# Patient Record
Sex: FEMALE | Race: WHITE
[De-identification: names, ages, dates, MRNs, and addresses within clinical notes are randomized per-mention and may not be internally consistent; named-entity substitution may affect disease eponyms.]

---

## 2022-09-21 ENCOUNTER — HOSPITAL ENCOUNTER (EMERGENCY)
Dept: HOSPITAL 97 - ER | Age: 62
Discharge: HOME | End: 2022-09-21
Payer: COMMERCIAL

## 2022-09-21 DIAGNOSIS — R07.89: Primary | ICD-10-CM

## 2022-09-21 DIAGNOSIS — I11.9: ICD-10-CM

## 2022-09-21 DIAGNOSIS — I10: ICD-10-CM

## 2022-09-21 DIAGNOSIS — M54.9: ICD-10-CM

## 2022-09-21 LAB
ALBUMIN SERPL BCP-MCNC: 3.6 G/DL (ref 3.4–5)
ALP SERPL-CCNC: 78 U/L (ref 45–117)
ALT SERPL W P-5'-P-CCNC: 27 U/L (ref 12–78)
AST SERPL W P-5'-P-CCNC: 17 U/L (ref 15–37)
BUN BLD-MCNC: 16 MG/DL (ref 7–18)
GLUCOSE SERPLBLD-MCNC: 132 MG/DL (ref 74–106)
HCT VFR BLD CALC: 38.7 % (ref 36–45)
INR BLD: 1.03
LIPASE SERPL-CCNC: 178 U/L (ref 73–393)
LYMPHOCYTES # SPEC AUTO: 2.5 K/UL (ref 0.7–4.9)
MAGNESIUM SERPL-MCNC: 2.1 MG/DL (ref 1.8–2.4)
MCV RBC: 82.4 FL (ref 80–100)
NT-PROBNP SERPL-MCNC: 42 PG/ML (ref ?–125)
PMV BLD: 8.2 FL (ref 7.6–11.3)
POTASSIUM SERPL-SCNC: 3.5 MMOL/L (ref 3.5–5.1)
RBC # BLD: 4.7 M/UL (ref 3.86–4.86)
TROPONIN I SERPL HS-MCNC: 4 PG/ML (ref ?–58.9)

## 2022-09-21 PROCEDURE — 84484 ASSAY OF TROPONIN QUANT: CPT

## 2022-09-21 PROCEDURE — 36415 COLL VENOUS BLD VENIPUNCTURE: CPT

## 2022-09-21 PROCEDURE — 71275 CT ANGIOGRAPHY CHEST: CPT

## 2022-09-21 PROCEDURE — 71045 X-RAY EXAM CHEST 1 VIEW: CPT

## 2022-09-21 PROCEDURE — 99284 EMERGENCY DEPT VISIT MOD MDM: CPT

## 2022-09-21 PROCEDURE — 85610 PROTHROMBIN TIME: CPT

## 2022-09-21 PROCEDURE — 80048 BASIC METABOLIC PNL TOTAL CA: CPT

## 2022-09-21 PROCEDURE — 80076 HEPATIC FUNCTION PANEL: CPT

## 2022-09-21 PROCEDURE — 85025 COMPLETE CBC W/AUTO DIFF WBC: CPT

## 2022-09-21 PROCEDURE — 83735 ASSAY OF MAGNESIUM: CPT

## 2022-09-21 PROCEDURE — 93005 ELECTROCARDIOGRAM TRACING: CPT

## 2022-09-21 PROCEDURE — 96374 THER/PROPH/DIAG INJ IV PUSH: CPT

## 2022-09-21 PROCEDURE — 85379 FIBRIN DEGRADATION QUANT: CPT

## 2022-09-21 PROCEDURE — 83880 ASSAY OF NATRIURETIC PEPTIDE: CPT

## 2022-09-21 PROCEDURE — 83690 ASSAY OF LIPASE: CPT

## 2022-09-21 PROCEDURE — 96375 TX/PRO/DX INJ NEW DRUG ADDON: CPT

## 2022-09-21 NOTE — RAD REPORT
EXAM DESCRIPTION:  CT - Chest For Pe Angio - 9/21/2022 7:34 pm

 

CLINICAL HISTORY:  Chest pain.

upper back pain, shortness of breath

 

COMPARISON:  No comparisons

 

TECHNIQUE:  CT angiogram of the pulmonary arteries was performed with MIP.

 

All CT scans are performed using dose optimization technique as appropriate and may include automated
 exposure control or mA/KV adjustment according to patient size.

 

FINDINGS:  No evidence of pulmonary thromboembolism.

 

No acute aortic finding demonstrated.

 

The lungs are clear.

 

No significant pericardial or pleural fluid.

 

No concerning bony finding.

 

IMPRESSION:  No evidence of pulmonary thromboembolism.

 

No acute lung findings.

## 2022-09-21 NOTE — ER
Nurse's Notes                                                                                     

 [facility]                                                                 

Name: Jewell Raman                                                                            

Age: 61 yrs                                                                                       

Sex: Female                                                                                       

: 1960                                                                                   

MRN: [id number]                                                                                   

Arrival Date: 2022                                                                          

Time: 15:43                                                                                       

Account#: [id number]                                                                            

Bed 25                                                                                            

Private MD: Priyanka Clark                                                                          

Diagnosis: Dorsalgia, unspecified;Chest pain, unspecified;Hypertensive heart disease without heart

  failure                                                                                         

                                                                                                  

Presentation:                                                                                     

                                                                                             

16:06 Chief complaint: Patient states: left back pain under shoulder blade, shoots into       iw  

      breast, pain started a couple days ago, denies injury , just got back from a fishing        

      trip and thought she sprained it , a week ago she had swollen lymph node on left side       

      of neck but that has resolved . Coronavirus screen: At this time, the client does not       

      indicate any symptoms associated with coronavirus-19. Ebola Screen: Patient negative        

      for fever greater than or equal to 101.5 degrees Fahrenheit, and additional compatible      

      Ebola Virus Disease symptoms Patient denies exposure to infectious person. Patient          

      denies travel to an Ebola-affected area in the 21 days before illness onset. No             

      symptoms or risks identified at this time. Initial Sepsis Screen: Does the patient meet     

      any 2 criteria? No. Patient's initial sepsis screen is negative. Does the patient have      

      a suspected source of infection? No. Patient's initial sepsis screen is negative. Risk      

      Assessment: Do you want to hurt yourself or someone else? Patient reports no desire to      

      harm self or others. Onset of symptoms was 2022.                              

16:06 Method Of Arrival: Ambulatory                                                           iw  

16:06 Acuity: IGGY 3                                                                           iw  

                                                                                                  

Triage Assessment:                                                                                

22:03 General: Appears in no apparent distress. Behavior is calm, cooperative. Pain:          kd3 

      Complains of pain in chest. Cardiovascular: Patient's skin is warm and dry.                 

                                                                                                  

Historical:                                                                                       

- Allergies:                                                                                      

16:09 No Known Allergies;                                                                     iw  

- Home Meds:                                                                                      

16:09 lisinopril 20 mg Oral tab 1 tab once daily [Active];                                    iw  

- PMHx:                                                                                           

16:09 Hypertensive disorder;                                                                  iw  

- PSHx:                                                                                           

16:09 tubal ligation;                                                                         iw  

                                                                                                  

- Immunization history:: Adult Immunizations up to date.                                          

- Social history:: Smoking status: Patient denies any tobacco usage or history of.                

  unknown.                                                                                        

                                                                                                  

                                                                                                  

Screenin:02 Abuse screen: Denies threats or abuse. Denies injuries from another. Nutritional        kd3 

      screening: No deficits noted. Tuberculosis screening: No symptoms or risk factors           

      identified. Fall Risk IV access (20 points).                                                

                                                                                                  

Assessment:                                                                                       

21:30 General: Appears in no apparent distress. Behavior is calm, cooperative. Pain:          ha1 

      Complains of pain in left subscapular area Pain radiates to left shoulder Pain              

      currently is 10 out of 10 on a pain scale. Pain began 1 day ago. Neuro: Level of            

      Consciousness is awake, alert, obeys commands, Oriented to person, place, time,             

      situation. Cardiovascular: Patient's skin is warm and dry. Respiratory: Airway is           

      patent Trachea midline Respiratory effort is even, unlabored, Respiratory pattern is        

      regular, symmetrical. Musculoskeletal: Range of motion: intact in all extremities,          

      Reports pain in left subscapular area.                                                      

21:42 Reassessment: Patient and/or family updated on plan of care and expected duration. Pain ha1 

      level reassessed. Patient is alert, oriented x 3, equal unlabored respirations, skin        

      warm/dry/pink. Patient denies pain at this time. Patient states feeling better.             

23:03 Reassessment: Patient and/or family updated on plan of care and expected duration. Pain ha1 

      level reassessed. Patient is alert, oriented x 3, equal unlabored respirations, skin        

      warm/dry/pink. Patient denies pain at this time.                                            

                                                                                                  

Vital Signs:                                                                                      

16:06  / 96; Pulse 72; Resp 16; Temp 97.4; Pulse Ox 98% on R/A; Weight 90.72 kg; Height iw  

      5 ft. 4 in. (162.56 cm); Pain 10/10;                                                        

21:15  / 86; Pulse 72; Resp 19 S; Pulse Ox 98% on R/A;                                  ha1 

21:42 Resp 17 S; Pulse Ox 98% on R/A; Pain 3/10;                                              ha1 

22:02  / 88; Pulse 68; Resp 16; Pulse Ox 98% on R/A;                                    kd3 

23:07  / 92; Pulse 64; Resp 16; Pulse Ox 99% on R/A;                                    kd3 

16:06 Body Mass Index 34.33 (90.72 kg, 162.56 cm)                                               

                                                                                                  

ED Course:                                                                                        

15:43 Patient arrived in ED.                                                                  mr  

15:44 Priyanka Clark is Private Physician.                                                      mr  

16:09 Triage completed.                                                                       iw  

16:10 Arm band placed on.                                                                     iw  

16:13 Maksim Ho PA is PHCP.                                                                cp  

16:13 Maksim Posey MD is Attending Physician.                                             cp  

16:42 Inserted saline lock: 20 gauge in right forearm, using aseptic technique. Blood         kc6 

      collected.                                                                                  

16:42 Lipase Sent.                                                                            kc6 

16:42 Basic Metabolic Panel Sent.                                                             kc6 

16:42 CBC with Diff Sent.                                                                     kc6 

16:42 LFT's Sent.                                                                             kc6 

16:42 Magnesium Sent.                                                                         kc6 

16:42 NT PRO-BNP Sent.                                                                        kc6 

16:42 PT-INR Sent.                                                                            kc6 

16:42 Troponin HS Sent.                                                                       kc6 

20:42 Kaylin Llanos, JOURDAN is Primary Nurse.                                                    kd3 

23:06 No provider procedures requiring assistance completed. IV discontinued, intact,         kd3 

      bleeding controlled, No redness/swelling at site. Pressure dressing applied. Patient        

      maintains SpO2 saturation greater than 95% on room air.                                     

23:07 Patient has correct armband on for positive identification. Client placed on continuous kd3 

      cardiac and pulse oximetry monitoring. NIBP monitoring applied.                             

                                                                                                  

Administered Medications:                                                                         

21:19 Drug: TORadol (ketorolac) 30 mg Route: IVP; Site: right forearm;                        ha1 

21:42 Follow up: Response: No adverse reaction; Pain is decreased                             ha1 

21:19 Drug: morphine 4 mg Route: IVP; Infused Over: 4 mins; Site: right forearm;              ha1 

21:42 Follow up: Response: No adverse reaction; Pain is decreased; RASS: Alert and Calm (0)   ha1 

22:21 Drug: Lidoderm Patch 5 % (700 mg/patch) 1 patches Route: Topical; Site: affected area;  kd3 

23:06 Follow up: Response: No adverse reaction; Pain is decreased                             kd3 

22:58 Drug: Baclofen 10 mg Route: PO;                                                         kd3 

23:06 Follow up: Response: No adverse reaction; Pain is decreased                             kd3 

                                                                                                  

                                                                                                  

Medication:                                                                                       

23:07 VIS not applicable for this client.                                                     kd3 

                                                                                                  

Outcome:                                                                                          

22:58 Discharge ordered by MD.                                                                cp  

23:06 Discharged to home ambulatory.                                                          kd3 

23:06 Condition: stable                                                                           

23:06 Discharge instructions given to patient, family, Instructed on discharge instructions,      

      follow up and referral plans. medication usage, Demonstrated understanding of               

      instructions, follow-up care, medications.                                                  

23:08 Patient left the ED.                                                                    kd3 

                                                                                                  

Signatures:                                                                                       

Amanda Rodriguez Irene RN                     RN                                                      

Maksim Ho PA PA cp Doucette, Kyli, RN                      RN   kd3                                                  

Jerri Swift RN                        RN   ha1                                                  

Danuta Suárez                            kc6                                                  

                                                                                                  

Corrections: (The following items were deleted from the chart)                                    

16:10 16:09 PSHx: None; [facility]  

23:03 22:30 Reassessment: Patient and/or family updated on plan of care and expected          ha1 

      duration. Pain level reassessed. Patient is alert, oriented x 3, equal unlabored            

      respirations, skin warm/dry/pink. Patient denies pain at this time. Patient states          

      feeling better. ha1                                                                         

                                                                                                  

**************************************************************************************************

## 2022-09-21 NOTE — XMS REPORT
Continuity of Care Document

                          Created on:2022



Patient:JEWELL BROWN

Sex:Female

:1960

External Reference #:[id number]





Demographics







                          Address                   [address] [place] [address]

 

                          Home Phone                [phone]

 

                          Mobile Phone              [phone]

 

                          Email Address             [email or web address]

 

                          Preferred Language        en

 

                          Marital Status            Unknown

 

                          Faith Affiliation     Unknown

 

                          Race                      Unknown

 

                          Additional Race(s)        Unavailable



                                                    White

 

                          Ethnic Group              Not  or 









Author







                          Organization              [facility]

t

 

                          Address                   [address] [place] [address] [address]



                                                    [place] [address]

 

                          Phone                     [phone]









Support







                Name            Relationship    Address         Phone

 

                Jewell Brown Unavailable     [address] [place] [address] [phone]



                                                [place] [address] 

 

                Caleb Bear Unavailable     Unavailable     [phone]

 

                Jcarlos Smith  Domestic partner [address] [place] [address] [phone]



                                                [place] [address] 









Care Team Providers







                    Name                Role                Phone

 

                    Priyanka Jimenez Primary Care Physician [phone]

[phone]

 

                    Priyanka Clark        Attending Clinician Unavailable

 

                    Matthew MATHEWS, Berto       Attending Clinician [phone]

 

                    Lali Tirado MD   Attending Clinician [phone]

 

                    Mily Flores Attending Clinician Unavailable









Payers







           Payer Name Policy Type Policy Number Effective Date Expiration Date S

ource







Problems







       Condition Condition Condition Status Onset  Resolution Last   Treating Co

mments 

Source



       Name   Details Category        Date   Date   Treatment Clinician        



                                                 Date                 

 

       Essential Essential Disease Active                              [facility]

[facility]



       hypertensi hypertensi               7-05                               st



       on     on                   00:00:                             Hospita



                                   00                                 l

 

       Crohn's Crohn's Disease Active                       Overview: [facility]

[facility]



       disease of disease of               6-14                        Formattin

 st



       both small both small               00:00:                      g of this

 Hospita



       and large and large               00                          note   l



       intestine intestine                                           might be 



       without without                                           different 



       complicati complicati                                           from the 



       on     on                                               original. 



                                                               Added  



                                                               automatic 



                                                               ally from 



                                                               request 



                                                               for    



                                                               surgery 



                                                               [id number] 







Allergies, Adverse Reactions, Alerts

This patient has no known allergies or adverse reactions.



Family History







           Family Member Diagnosis  Comments   Start Date Stop Date  Source

 

           Natural father Crohn's disease                                  [facility]

 

           Natural mother Cancer                                      [facility] mother Kidney failure                                  [facility]







Social History







           Social Habit Start Date Stop Date  Quantity   Comments   Source

 

           Tobacco use and 2022 Smokeless tobacco            [facility]

[facility]



           exposure   00:00:00   00:00:00   non-user              Hospital

 

           Alcohol intake 2022 Ex-drinker            [facility]



                      00:00:00   00:00:00   (finding)             Hospital

 

           Sex Assigned At 1960 F                     [facility]



           Birth      00:00:00   00:00:00                         Hospital









                Smoking Status  Start Date      Stop Date       Source

 

                Never smoked tobacco                                 [facility]

ospital







Medications







       Ordered Filled Start  Stop   Current Ordering Indication Dosage Frequency

 Signature

                    Comments            Components          Source



     Medication Medication Date Date Medication? Clinician                (SIG) 

          



     Name Name                                                   

 

     vedolizumab                   300mg      Infuse 300         

  [facility]



     (ENTYVIO)       0705                          mg into a           st



     300 mg      19:38: 00:00                          venous           Hospita



     recon soln      45   :00                           catheter           l



     IV solution                                         once.           

 

     aspirin            Yes            1{tbl}      1 tablet.           [facility]

[facility]



     (ECOTRIN)                                                    st



     81 MG      16:27:                                              Hospita



     enteric      46                                                l



     coated                                                        



     tablet                                                        

 

     Lactobac            Yes                                     [facility]



     no.41/Bifid                                                    st



     obact no.7      16:27:                                              [facility]ospita



     (PROBIOTIC-      46                                                l



     10 ORAL)                                                        

 

     zinc            Yes            1{tbl}      1 tablet.           [facility]

i



     gluconate                                                    st



     100 mg      16:27:                                              Hospita



     tablet      46                                                l

 

     cholecalcif            Yes                      Take by           Met

janeen



     vicki,                                     mouth.           st



     vitamin D3,      16:27:                                              Hospit

a



     125 mcg      46                                                l



     (5,000                                                        



     unit)                                                        



     tablet                                                        

 

     BIOTIN,            Yes                                     [facility]



     BULK, MISC                                                    st



               16:27:                                              Hospita



               46                                                l

 

     ascorbic      0      Yes                      Take by           Method

i



     acid                                     mouth.           st



     (VITAMIN C      16:27:                                              Hospita



     ORAL)      46                                                l

 

     multivitami            Yes            1{tbl} QD   Take 1           Me

thodi



     n tablet                                     tablet by           st



               16:27:                               mouth           Hospita



               46                                 daily.           l

 

     calcium            Yes                      Take by           [facility]



     carbonate                                     mouth.           st



     (CALCIUM      16:27:                                              Hospita



     600 ORAL)      46                                                l

 

     fish            Yes                      Take by           [facility]



     oil/borage/      7-05                               mouth.           st



     flax/om3,6,      16:27:                                              Hospit

a



     9 1 (OMEGA      46                                                l



     3-6-9 ORAL)                                                        

 

     hydrOXYchlo            Yes                      take as           [facility]

h[facility]



     roQUINE      -13                               directed.           st



     (PLAQUENIL)      00:00:                                              Hospit

a



     200 mg      00                                                l



     tablet                                                        

 

     sod       2022- No             1{bottl Q.5D Take 1           [facility]



     picosulf-ma       06-15                e}        Bottle by           st



     g ox-citric      00:00: 04:59                          mouth 2           Ho

spita



     ac        00   :00                           (two)           l



     (Clenpiq)                                         times a           



     10 mg-3.5                                         day for 1           



     gram -12                                         day. Take           



     gram/160 mL                                         at times           



     solution                                         mentioned           



                                                  in your           



                                                  colonoscop           



                                                  y              



                                                  instructio           



                                                  ns             

 

     bisacodyL       No             20mg      Take 4           [facility]

di



     (Dulcolax,      -14                          tablets           st



     bisacodyl,)      00:00: 04:59                          (20 mg           Hos

kristopher



     5 mg EC      00   :00                           total) by           l



     tablet                                         mouth once           



                                                  for 1           



                                                  dose. Take           



                                                  at 3pm day           



                                                  prior to           



                                                  procedure           

 

     lisinopriL-            Yes            1{tbl} QD   Take 1           Me

thodi



     hydrochloro      6-02                               tablet by           st



     thiazide      00:00:                               mouth           Hospita



     (PRINZIDE)      00                                 daily.           l



     20-12.5 mg                                                        



     per tablet                                                        

 

     RABEprazole            Yes            20mg QD   Take 20 mg           

[facility]



     (ACIPHEX)      5-22                               by mouth           st



     20 mg EC      00:00:                               daily.           Hospita



     tablet      00                                                l







Vital Signs







             Vital Name   Observation Time Observation Value Comments     Source

 

             Systolic blood 2022 21:25:00 146 mm[Hg]                [facility]



             pressure                                            

 

             Diastolic blood 2022 21:25:00 74 mm[Hg]                 [facility]



             pressure                                            

 

             Heart rate   2022 21:25:00 67 /min                   [facility]

 

             Body height  2022 21:25:00 162.6 cm                  [facility]

 

             Body weight  2022 21:25:00 90.719 kg                 [facility]

 

             BMI          2022 21:25:00 34.33 kg/m2               [facility]

 

             Oxygen saturation in 2022 21:25:00 96 /min                   

[facility]



             Arterial blood by                                        



             Pulse oximetry                                        

 

             Respiratory rate 2022 16:07:00 13 /min                   [facility]







Procedures







                Procedure       Date / Time     Performing Clinician Source



                                Performed                       

 

                ECG 12-LEAD     2022 21:32:26 CandidaLali  [facility]

spital

 

                CT ENTEROGRAPHY 2022 17:49:16 Matthew, [facility]

spital

 

                FECAL CALPROTECTIN 2022 13:00:00 Matthew, [facility]

 

                TPMT GENOTYPING, 4 2022 17:44:00 Matthew, [facility]



                VARIANTS                                        

 

                C-REACTIVE PROTEIN 2022 17:44:00 Walton, [facility]

 

                QUANTIFERON-TB GOLD PLUS, 2022 17:44:00 Walton, [facility]



                4 TUBE                                          

 

                VITAMIN B12 LEVEL 2022 17:44:00 Walton, [facility]

 

                FOLATE LEVEL    2022 17:44:00 Matthew, [facility]

spital

 

                COCCIDIOIDES ANTIBODY, 2022 17:44:00 Matthew, [facility]



                IGG/IGM BY INGE                                 

 

                TOTAL IRON BINDING 2022 17:44:00 Walton, [facility]



                CAPACITY                                        

 

                FERRITIN LEVEL  2022 17:44:00 Matthew, [facility]

spital

 

                VITAMIN D 25 HYDROXY 2022 17:44:00 Matthew, [facility]



                LEVEL                                           

 

                HEPATITIS B CORE ANTIBODY 2022 17:44:00 Matthew, [facility]



                IGM                                             

 

                HEPATITIS B SURFACE 2022 17:44:00 Matthew, [facility]



                ANTIGEN                                         

 

                HEPATITIS B SURFACE 2022 17:44:00 Walton, [facility]



                ANTIBODY                                        

 

                HEPATITIS A ANTIBODY 2022 17:44:00 Matthew, [facility]



                TOTAL                                           

 

                HEPATITIS C ANTIBODY 2022 17:44:00 Matthew, [facility]

 

                HEPATITIS B CORE ANTIBODY 2022 17:44:00 Walton, [facility]



                TOTAL                                           

 

                HC COMPLETE BLD COUNT 2022 17:44:00 Matthew, Berto      [facility]



                W/AUTO DIFF                                     

 

                HIV 1/2 ANTIGEN/ANTIBODY, 2022 17:44:00 MatthewBerto      [facility]



                FOURTH GENERATION, WITH                                 



                REFLEXES                                        

 

                COMPREHENSIVE METABOLIC 2022 17:44:00 MatthewBerto      [facility]



                PANEL                                           

 

                ESTIMATED GFR   2022 17:44:00 MatthewBerto      [facility]

spital







Plan of Care







             Planned Activity Planned Date Details      Comments     Source

 

             Future Scheduled 2022   INFLUENZA VACCINE              [facility]



             Test         11:01:31     [code = INFLUENZA              



                                       VACCINE]                  

 

             Future Scheduled 2022   COVID-19 VACCINE (#1)              [facility]



             Test         11:01:31     [code = COVID-19              



                                       VACCINE (#1)]              

 

             Future Scheduled 2022   Screening for              [facility]



             Test         11:01:31     malignant neoplasm of              



                                       cervix (procedure)              



                                       [code = 590410057]              

 

             Future Scheduled 2022   BREAST CANCER              [facility]



             Test         11:01:31     SCREENING [code =              



                                       BREAST CANCER              



                                       SCREENING]                

 

             Future Scheduled 2022   COLONOSCOPY SCREENING              [facility]



             Test         11:01:31     [code = COLONOSCOPY              



                                       SCREENING]                

 

             Future Scheduled 2022   SHINGLES VACCINES (1              Met

[facility]



             Test         11:01:31     of 2) [code =              



                                       SHINGLES VACCINES (1              



                                       of 2)]                    

 

             Future Scheduled 2022   HEPATITIS B VACCINES              Met

[facility]



             Test         11:01:31     (1 of 3 - Risk 3-dose              



                                       series) [code =              



                                       HEPATITIS B VACCINES              



                                       (1 of 3 - Risk 3-dose              



                                       series)]                  







Encounters







        Start   End     Encounter Admission Attending Care    Care    Encounter 

Source



        Date/Time Date/Time Type    Type    Clinicians Facility Department ID   

   

 

        2022         Outpatient         JENS Clark  [facility]  [id number]

 Common



        09:22:01                         Priyanka                      [facility]

 

        2022-02-10         [facility]  [id number]

 Common



        11:18:02                         Priyanka                      [facility]

 

        20[facility]  [id number]

 Common



        13:35:18                         Priyanka                      [facility]

 

        2022         Outpatient         JENS Clark  [facility]  [id number]

 Common



        12:57:27                         Priyanka                   [id number]   [facility]

 

        2022         Outpatient         JENS Clark  [facility]  [id number]

 Common



        12:47:18                         Priyanka                   [id number]   [facility]

 

        2022 Hospital         Matthew, Berto 1.2.840.1 [id number]

[id number] 

[facility]



        00:00:00 00:00:00 Encounter                 24031.1.1         910     st



                                                3.430.2.7                 Hospit

a



                                                .[id number]                 l



                                                .8                      

 

        2022 [facility]         Matthew, Berto 1.2.840.1 [id number]

[id number] 

[facility]



        11:25:36 23:59:00 Encounter                 29135.1.1         888     st



                                                3.430.2.7                 Hospit

a



                                                .3.475874                 l



                                                .8                      

 

        2022 Office          Adrogue, 1.2.840.1 [id number]

[id number] [facility]



        16:00:00 16:55:33 Visit           Lali   31111.1.1         813     st



                                                3.430.2.7                 Hospit

a



                                                .3.630831                 l



                                                .8                      

 

        2022 Lab             Walton, Berto 1.2.840.1 [id number]

[id number] [facility]



        13:00:00 13:05:00                         89836.1.1         036     st



                                                3.430.2.7                 Hospit

a



                                                .3.138298                 l



                                                .8                      

 

        2022 Outpatient         ADROGUE, [facility]     [id number]

[id number]



        00:00:00 00:00:00                 LALI                   813     Method

i



                                                                        st

 

        2022 Outpatient         MATTHEW, BERTO [facility]     [id number]

[id number] [facility]



        00:00:00 00:00:00                                         036     Method

i



                                                                        st

 

        2022 Outpatient         MATTHEW, BERTO [facility]     2100

[id number] [facility]



        00:00:00 00:00:00                                         888     Method

i



                                                                        st

 

        2022 Travel                  1.2.840.1 [id number]

[id number] [facility]



        00:00:00 00:00:00                         16396.1.1 [id number] 576     

st



                                                3.430.2.7 0.2.7.3.698         Ho

spita



                                                .3.095797 084.8           l



                                                .8                      

 

        2022 ambulatory                 [facility]  [facility]  [id number]

 Common



        00:00:00 00:00:00                                                 [facility]



                                                                        - [facility]

 

        2022 Prep for         Mark, 1.2.840.1 [id number]

[id number] [facility]



        00:00:00 00:00:00 Surgery         Mily SANTA 73508.1.1         834     s

t



                                                3.430.2.7                 Hospit

a



                                                .3.869405                 l



                                                .8                      

 

        2022 Lab             Berto Castro 1.2.840.1 [id number]

[id number] Methodi



        12:10:00 12:15:00                         29914.1.1         044     st



                                                3.430.2.7                 Hospit

a



                                                .3.544131                 l



                                                .8                      

 

        2022 Office          MatthewBerto wilde 1.2.840.1 [id number]

[id number] Methodi



        11:00:00 11:35:12 Visit                   47542.1.1         077     st



                                                3.430.2.7                 Hospit

a



                                                .3.433935                 l



                                                .8                      

 

        2022 Outpatient         MATTHEW, BERTO [facility]     

[id number] [place]



        00:00:00 00:00:00                                         077     Method

i



                                                                        st

 

        2022 Travel                  1.2.840.1 1.2.606.586 [id number]

[id number] Methodi



        00:00:00 00:00:00                         56632.1.1 350.1.13.43 790     

st



                                                3.430.2.7 0.2.7.3.698         Ho

spita



                                                .3.295919 084.8           l



                                                .8                      

 

        2022 Outpatient         MATTHEW, BERTO [facility]     [id number]

[id number] [place]



        00:00:00 00:00:00                                         044     Method

i



                                                                        st

 

        2022 Travel                  1.2.840.1 1.2.060.023 [id number]

[id number] Methodi



        00:00:00 00:00:00                         16434.1.1 350.1.13.43 088     

st



                                                3.430.2.7 0.2.7.3.698         Ho

spita



                                                .3.890510 084.8           l



                                                .8                      

 

        2022 ambulatory                 [facility]  [facility]  [id number]

 Common



        00:00:00 00:00:00                                                 [facility]

 

        2021 Outpatient                 [facility]  [facility]  [id number]

 Common



        00:00:00 00:00:00                                                 [facility]

 

        2021-10-06 2021-10-06 Outpatient                 [facility]  [facility]  [id number]

 Common



        00:00:00 00:00:00                                                 [facility]

 

        2021-10-05 2021-10-05 Outpatient                 [facility]  [facility]  [id number]

 Common



        00:00:00 00:00:00                                                 [facility]

 

        2021 Outpatient                 [facility]  [facility]  [id number]

 Common



        00:00:00 00:00:00                                                 [facility]

 

        2021 Outpatient                 [facility]  [facility]  [id number]

 Common



        00:00:00 00:00:00                                                 [facility]

 

        2021 Outpatient                 [facility]  [facility]  [id number]

 Common



        00:00:00 00:00:00                                                 [facility]

 

        2021 Outpatient                 [facility]  [facility]  [id number]

 Common



        00:00:00 00:00:00                                                 [facility]

 

        2021 Outpatient                 [facility]  [facility]  [id number]

 Common



        00:00:00 00:00:00                                                 [facility]







Results







           Test Description Test Time  Test Comments Results    Result Comments 

Source









                    ECG 12 lead         2022-07-15 21:20:14 









                      Test Item  Value      Reference Range Interpretation Comme

nts









             Ventricular rate (test code = 253)                                 

       

 

             Atrial rate (test code = 255)                                      

  

 

             DE interval (test code = 266)                                      

  

 

             QRSD interval (test code = 260)                                    

    

 

             QT interval (test code = 264)                                      

  

 

             QTC interval (test code = 265)                                     

   

 

             P axis 1 (test code = 267)                                        

 

             QRS axis 1 (test code = 268)                                       

 

 

             T wave axis (test code = 270)                                      

  

 

             EKG impression (test code = 273) Normal sinus rhythm-Inferior infar

ct , age                           



                          undetermined-Abnormal ECG-No previous ECGs            

               



                          available-Electronically Signed By Lali Tirado MD () on 7/15/2022 4:20:13 PM              

             



[id number]39:00





             Test Item    Value        Reference Range Interpretation Comments

 

             Fecal calprotectin (test              See_Comment  H             [A

utomated message]



             code = 38445-3)                                        The system w

[facility]



                                                                 generated this 

result



                                                                 transmitted ref

erence



                                                                 range: <15.6-12

0



                                                                 mg/kg. The refe

rence



                                                                 range was not u

sed to



                                                                 interpret this 

result



                                                                 as normal/abnor

mal.

 

             Lab Interpretation (test Abnormal                               



             code = 49302-7)                                        



[facility]

## 2022-09-21 NOTE — EDPHYS
Physician Documentation                                                                           

 [facility]                                                                 

Name: Jewell Raman                                                                            

Age: 61 yrs                                                                                       

Sex: Female                                                                                       

: 1960                                                                                   

MRN: [id number]                                                                                   

Arrival Date: 2022                                                                          

Time: 15:43                                                                                       

Account#: [id number]                                                                            

Bed 25                                                                                            

Private MD: Priyanka Clark ED Physician Maksim Posey                                                                      

HPI:                                                                                              

                                                                                             

16:25 This 61 yrs old Female presents to ER via Ambulatory with complaints of Chest Pain,     cp  

      Back Pain, High Blood Pressure.                                                             

16:25 The patient presents with pain that is acute, with no known mechanism of injury. The    cp  

      symptoms are located in the left scapular area and left subscapular area.                   

16:25 Onset: The symptoms/episode began/occurred 2-3 days ago. The pain radiates to the left  cp  

      side of chest.                                                                              

16:25 Associated signs and symptoms: Pertinent negatives: abdominal pain, fever, numbness,    cp  

      tingling, vomiting, weakness. The problem was sustained from unknown cause. Modifying       

      factors: the patient symptoms are aggravated by movement, deep breaths. Severity of         

      symptoms: in the emergency department the symptoms are unchanged, despite home              

      interventions.                                                                              

                                                                                                  

Historical:                                                                                       

- Allergies:                                                                                      

16:09 No Known Allergies;                                                                     iw  

- Home Meds:                                                                                      

16:09 lisinopril 20 mg Oral tab 1 tab once daily [Active];                                    iw  

- PMHx:                                                                                           

16:09 Hypertensive disorder;                                                                  iw  

- PSHx:                                                                                           

16:09 tubal ligation;                                                                         iw  

                                                                                                  

- Immunization history:: Adult Immunizations up to date.                                          

- Social history:: Smoking status: Patient denies any tobacco usage or history of.                

  unknown.                                                                                        

                                                                                                  

                                                                                                  

ROS:                                                                                              

16:30 Back: Positive for pain at rest, pain with movement, of the left scapular area and left cp  

      subscapular area.                                                                           

16:30 Constitutional: Negative for body aches, chills, fever, poor PO intake.                 cp  

16:30 Eyes: Negative for injury, pain, redness, and discharge.                                cp  

16:30 ENT: Negative for drainage from ear(s), ear pain, sore throat, difficulty swallowing,       

      difficulty handling secretions.                                                             

16:30 Cardiovascular: Positive for chest pain, of the left side of chest, Negative for edema,     

      palpitations.                                                                               

16:30 Respiratory: Negative for cough, shortness of breath, wheezing.                             

16:30 Abdomen/GI: Negative for abdominal pain, nausea, vomiting, and diarrhea.                    

16:30 Skin: Negative for cellulitis, rash.                                                        

16:30 Neuro: Negative for altered mental status, headache, numbness, tingling, weakness.          

16:30 All other systems are negative.                                                             

                                                                                                  

Exam:                                                                                             

16:35 Constitutional: The patient appears in no acute distress, alert, awake,                 cp  

      non-diaphoretic, non-toxic, well developed, well nourished, uncomfortable.                  

16:35 Head/Face:  Normocephalic, atraumatic.                                                  cp  

16:35 Eyes: Periorbital structures: appear normal, Conjunctiva: normal, no exudate, no            

      injection, Sclera: no appreciated abnormality, Lids and lashes: appear normal,              

      bilaterally.                                                                                

16:35 ENT: External ear(s): are unremarkable, Nose: is normal, Mouth: Lips: moist, Oral           

      mucosa: pink and intact, moist, Posterior pharynx: Airway: no evidence of obstruction,      

      patent.                                                                                     

16:35 Neck: ROM/movement: is normal, is supple, without pain, no range of motions                 

      limitations, no nuchal rigidity.                                                            

16:35 Chest/axilla: Inspection: normal, Palpation: is normal, no crepitus, no tenderness.         

16:35 Cardiovascular: Rate: normal, Rhythm: regular, Edema: is not appreciated, JVD: is not       

      appreciated.                                                                                

16:35 Respiratory: the patient does not display signs of respiratory distress,  Respirations:     

      normal, no use of accessory muscles, no retractions, labored breathing, is not present,     

      Breath sounds: are clear throughout, no decreased breath sounds, no stridor, no             

      wheezing.                                                                                   

16:35 Abdomen/GI: Inspection: abdomen appears normal, Palpation: abdomen is soft and              

      non-tender, in all quadrants.                                                               

16:35 Back: pain, that is moderate, of the  left scapular area and left subscapular area, ROM     

      is normal, CVA tenderness, is absent, vertebral tenderness, is not appreciated.             

16:35 Skin: cellulitis, is not appreciated, no rash present.                                      

16:35 Neuro: Orientation: to person, place \T\ time. Mentation: is normal, Motor: moves all       

      fours, strength is normal, Sensation: is normal.                                            

16:42 ECG was reviewed by the Attending Physician.                                            cp  

                                                                                                  

Vital Signs:                                                                                      

16:06  / 96; Pulse 72; Resp 16; Temp 97.4; Pulse Ox 98% on R/A; Weight 90.72 kg; Height iw  

      5 ft. 4 in. (162.56 cm); Pain 10/10;                                                        

21:15  / 86; Pulse 72; Resp 19 S; Pulse Ox 98% on R/A;                                  ha1 

21:42 Resp 17 S; Pulse Ox 98% on R/A; Pain 3/10;                                              ha1 

22:02  / 88; Pulse 68; Resp 16; Pulse Ox 98% on R/A;                                    kd3 

23:07  / 92; Pulse 64; Resp 16; Pulse Ox 99% on R/A;                                    kd3 

16:06 Body Mass Index 34.33 (90.72 kg, 162.56 cm)                                             iw  

                                                                                                  

MDM:                                                                                              

16:26 Patient medically screened.                                                             cp  

22:58 Data reviewed: vital signs, nurses notes, lab test result(s), EKG, radiologic studies,  cp  

      CT scan, plain films.                                                                       

22:58 Test interpretation: by ED physician or midlevel provider: ECG, plain radiologic        cp  

      studies. Counseling: I had a detailed discussion with the patient and/or guardian           

      regarding: the historical points, exam findings, and any diagnostic results supporting      

      the discharge/admit diagnosis, the presence of at least one elevated blood pressure         

      reading (>120/80) during this emergency department visit, lab results, radiology            

      results, the need for outpatient follow up, a family practitioner, to return to the         

      emergency department if symptoms worsen or persist or if there are any questions or         

      concerns that arise at home. Response to treatment: the patient's symptoms have             

      markedly improved after treatment, VSS. Patient reports pain markedly improved. Initial     

      and repeat EKGs and troponin negative. Will discharge to home for continued monitoring.     

                                                                                                  

                                                                                             

16:21 Order name: Basic Metabolic Panel                                                       cp  

                                                                                             

16:21 Order name: CBC with Diff                                                               cp  

                                                                                             

16:21 Order name: LFT's                                                                       cp  

                                                                                             

16:21 Order name: Magnesium                                                                   cp  

                                                                                             

16:21 Order name: NT PRO-BNP                                                                  cp  

                                                                                             

16:21 Order name: PT-INR                                                                      cp  

                                                                                             

16:21 Order name: Troponin HS                                                                 cp  

                                                                                             

16:27 Order name: Lipase                                                                      cp  

                                                                                             

16:42 Order name: CBC with Automated Diff; Complete Time: 18:08                               EDMS

                                                                                             

21:47 Interpretation: Reviewed.                                                               cp  

                                                                                             

16:44 Order name: Protime (+INR); Complete Time: 18:08                                        EDMS

                                                                                             

17:01 Order name: Basic Metabolic Panel; Complete Time: 18:08                                 EDMS

                                                                                             

18:08 Interpretation: Normal except: GLUC 132; GFR 74.                                          

                                                                                             

17:01 Order name: Liver (Hepatic) Function; Complete Time: 18:08                              EDMS

                                                                                             

21:47 Interpretation: Normal except: TP 8.3; GLOB 4.7; A/G 0.8.                                 

                                                                                             

17:01 Order name: Troponin High Sensitivity; Complete Time: 18:08                             EDMS

                                                                                             

21:48 Interpretation: Troponin HS 4.0; Reviewed.                                                

                                                                                             

17:01 Order name: NT PRO-BNP; Complete Time: 18:08                                            EDMS

                                                                                             

16:21 Order name: XRAY Chest (1 view)                                                           

                                                                                             

16:21 Order name: EKG; Complete Time: 16:21                                                     

                                                                                             

16:21 Order name: Cardiac monitoring; Complete Time: 22:02                                      

                                                                                             

17:01 Order name: Magnesium; Complete Time: 18:08                                             EDMS

                                                                                             

17:24 Order name: RAD; Complete Time: 18:08                                                   EDMS

                                                                                             

17:25 Order name: Lipase; Complete Time: 18:08                                                EDMS

                                                                                             

18:10 Order name: D-Dimer                                                                       

                                                                                             

18:10 Order name: LAB Add On                                                                    

                                                                                             

18:30 Order name: D-Dimer; Complete Time: 19:01                                               EDMS

                                                                                             

21:47 Interpretation: D-DIMER 507; Reviewed.                                                    

                                                                                             

19:02 Order name: CT Chest For PE Angio                                                         

                                                                                             

20:02 Order name: CT; Complete Time: 20:42                                                    EDMS

                                                                                             

20:42 Interpretation: Report reviewed.                                                          

                                                                                             

20:53 Order name: Troponin HS; Complete Time: 22:57                                           cp  

                                                                                             

22:57 Interpretation: Reviewed.                                                                 

                                                                                             

16:21 Order name: EKG - Nurse/Tech; Complete Time: 16:42                                        

                                                                                             

16:21 Order name: IV Saline Lock; Complete Time: 16:43                                          

                                                                                             

16:21 Order name: Labs collected and sent; Complete Time: 16:43                                 

                                                                                             

16:21 Order name: O2 Per Protocol; Complete Time: 22:02                                         

                                                                                             

16:21 Order name: O2 Sat Monitoring; Complete Time: 22:02                                     cp  

                                                                                             

20:42 Order name: Blood Pressure Recheck; Complete Time: 22:03                                cp  

                                                                                                  

EC:42 Rate is 72 beats/min. Rhythm is regular. NH interval is normal. QRS interval is normal. cp  

      QT interval is normal. T waves are Inverted in lead aVR. Interpreted by me. Reviewed by     

      me.                                                                                         

                                                                                                  

Administered Medications:                                                                         

21:19 Drug: TORadol (ketorolac) 30 mg Route: IVP; Site: right forearm;                        ha1 

21:42 Follow up: Response: No adverse reaction; Pain is decreased                             ha1 

21:19 Drug: morphine 4 mg Route: IVP; Infused Over: 4 mins; Site: right forearm;              ha1 

21:42 Follow up: Response: No adverse reaction; Pain is decreased; RASS: Alert and Calm (0)   ha1 

22:21 Drug: Lidoderm Patch 5 % (700 mg/patch) 1 patches Route: Topical; Site: affected area;  kd3 

23:06 Follow up: Response: No adverse reaction; Pain is decreased                             kd3 

22:58 Drug: Baclofen 10 mg Route: PO;                                                         kd3 

23:06 Follow up: Response: No adverse reaction; Pain is decreased                             kd3 

                                                                                                  

                                                                                                  

Disposition Summary:                                                                              

22 22:58                                                                                    

Discharge Ordered                                                                                 

      Location: Home                                                                          cp  

      Problem: new                                                                            cp  

      Symptoms: have improved                                                                 cp  

      Condition: Stable                                                                       cp  

      Diagnosis                                                                                   

        - Dorsalgia, unspecified                                                              cp  

        - Chest pain, unspecified                                                             cp  

        - Hypertensive heart disease without heart failure                                    cp  

      Followup:                                                                               cp  

        - With: Private Physician                                                                  

        - When: 1 - 2 days                                                                         

        - Reason: Recheck today's complaints                                                       

      Discharge Instructions:                                                                     

        - Discharge Summary Sheet                                                             cp  

        - Acute Back Pain, Adult                                                              cp  

        - Nonspecific Chest Pain, Adult                                                       cp  

        - Hypertension, Adult                                                                 cp  

        - Aspirin and Your Heart                                                              cp  

        - Form - Blood Pressure Record Sheet                                                  cp  

        - How to Take Your Blood Pressure                                                     cp  

      Forms:                                                                                      

        - Medication Reconciliation Form                                                      cp  

        - Thank You Letter                                                                    cp  

        - Antibiotic Education                                                                cp  

        - Prescription Opioid Use                                                             cp  

      Prescriptions:                                                                              

        - Baclofen 10 mg Oral Tablet                                                               

            - take 1 tablet by ORAL route 3 times per day; 20 tablet; Refills: 0, Product     cp  

      Selection Permitted                                                                         

        - Diclofenac Sodium 75 mg Oral tablet,delayed release (DR/EC)                              

            - take 1 tablet by ORAL route 2 times per day; 20 tablet; Refills: 0, Product     cp  

      Selection Permitted                                                                         

        - Lidoderm 5 % Topical adhesive patch,medicated                                            

            - apply 1 patch by TOPICAL route once daily; 10 patch; Refills: 0, Product        cp  

      Selection Permitted                                                                         

Signatures:                                                                                       

Dispatcher [employer]                           Consuelo Hirsch, RN                     RN   iw                                                   

Maksim Ho PA PA cp Doucette, Kyli, RN                      RN   [id number]                                                  

Jerri Swift RN                        RN   [id number]                                                  

                                                                                                  

Corrections: (The following items were deleted from the chart)                                    

16:10 16:09 PSHx: None; iw                                                                    marvin  

                                                                                                  

**************************************************************************************************

## 2022-09-21 NOTE — RAD REPORT
EXAM DESCRIPTION:  RAD - Chest Single View - 9/21/2022 5:14 pm

 

CLINICAL HISTORY:  left upper back pain

Chest pain.

 

COMPARISON:  Chest Pa And Lat (2 Views) dated 11/7/2016; CHEST PA AND LAT 2 VIEW dated 5/13/2009

 

FINDINGS:  Portable technique limits examination quality.

 

Interstitial markings are mildly prominent bilaterally. This may represent interstitial pulmonary wilfredo
ma or viral infection. The heart is normal in size. No displaced fractures.

## 2022-09-22 NOTE — EKG
Test Date:    2022-09-21               Test Time:    16:36:18

Technician:   PEDRO PABLO                                    

                                                     

MEASUREMENT RESULTS:                                       

Intervals:                                           

Rate:         72                                     

CA:           154                                    

QRSD:         86                                     

QT:           398                                    

QTc:          435                                    

Axis:                                                

P:            51                                     

CA:           154                                    

QRS:          -28                                    

T:            25                                     

                                                     

INTERPRETIVE STATEMENTS:                                       

                                                     

Normal sinus rhythm

Normal ECG

Compared to ECG 11/07/2016 11:47:26

No significant changes



Electronically Signed On 09-22-22 13:37:52 CDT by Abraham Thomas

## 2022-09-23 VITALS — OXYGEN SATURATION: 99 % | SYSTOLIC BLOOD PRESSURE: 139 MMHG | DIASTOLIC BLOOD PRESSURE: 92 MMHG

## 2022-09-23 VITALS — TEMPERATURE: 97.4 F
